# Patient Record
Sex: FEMALE | Race: WHITE | Employment: STUDENT | URBAN - METROPOLITAN AREA
[De-identification: names, ages, dates, MRNs, and addresses within clinical notes are randomized per-mention and may not be internally consistent; named-entity substitution may affect disease eponyms.]

---

## 2019-08-07 ENCOUNTER — OFFICE VISIT (OUTPATIENT)
Dept: FAMILY MEDICINE | Facility: CLINIC | Age: 24
End: 2019-08-07
Payer: COMMERCIAL

## 2019-08-07 VITALS
BODY MASS INDEX: 23.78 KG/M2 | RESPIRATION RATE: 14 BRPM | SYSTOLIC BLOOD PRESSURE: 130 MMHG | HEIGHT: 69 IN | OXYGEN SATURATION: 97 % | TEMPERATURE: 98.4 F | WEIGHT: 160.56 LBS | DIASTOLIC BLOOD PRESSURE: 77 MMHG | HEART RATE: 76 BPM

## 2019-08-07 DIAGNOSIS — Z00.00 ROUTINE GENERAL MEDICAL EXAMINATION AT A HEALTH CARE FACILITY: Primary | ICD-10-CM

## 2019-08-07 DIAGNOSIS — H10.33 ACUTE CONJUNCTIVITIS OF BOTH EYES, UNSPECIFIED ACUTE CONJUNCTIVITIS TYPE: ICD-10-CM

## 2019-08-07 PROCEDURE — G0145 SCR C/V CYTO,THINLAYER,RESCR: HCPCS | Performed by: FAMILY MEDICINE

## 2019-08-07 PROCEDURE — 99385 PREV VISIT NEW AGE 18-39: CPT | Performed by: FAMILY MEDICINE

## 2019-08-07 RX ORDER — ESCITALOPRAM OXALATE 10 MG/1
10 TABLET ORAL DAILY
COMMUNITY
End: 2021-05-14

## 2019-08-07 RX ORDER — POLYMYXIN B SULFATE AND TRIMETHOPRIM 1; 10000 MG/ML; [USP'U]/ML
1-2 SOLUTION OPHTHALMIC EVERY 4 HOURS
Qty: 1 BOTTLE | Refills: 0 | Status: SHIPPED | OUTPATIENT
Start: 2019-08-07 | End: 2020-02-21

## 2019-08-07 ASSESSMENT — PAIN SCALES - GENERAL: PAINLEVEL: NO PAIN (0)

## 2019-08-07 ASSESSMENT — MIFFLIN-ST. JEOR: SCORE: 1556.65

## 2019-08-07 NOTE — PROGRESS NOTES
SUBJECTIVE:   CC: Alfredito Moffett is an 23 year old woman who presents for preventive health visit.     Healthy Habits:     Getting at least 3 servings of Calcium per day:  Yes    Bi-annual eye exam:  NO    Dental care twice a year:  Yes    Sleep apnea or symptoms of sleep apnea:  None    Diet:  Gluten-free/reduced and Other    Frequency of exercise:  4-5 days/week    Duration of exercise:  30-45 minutes    Taking medications regularly:  Yes    Medication side effects:  None    PHQ-2 Total Score: 0    Additional concerns today:  No        -------------------------------------    Today's PHQ-2 Score:   PHQ-2 ( 1999 Pfizer) 8/7/2019   Q1: Little interest or pleasure in doing things 0   Q2: Feeling down, depressed or hopeless 0   PHQ-2 Score 0   Q1: Little interest or pleasure in doing things Not at all   Q2: Feeling down, depressed or hopeless Not at all   PHQ-2 Score 0       Abuse: Current or Past(Physical, Sexual or Emotional)- No  Do you feel safe in your environment? Yes    Social History     Tobacco Use     Smoking status: Never Smoker     Smokeless tobacco: Never Used   Substance Use Topics     Alcohol use: No     If you drink alcohol do you typically have >3 drinks per day or >7 drinks per week? No    Alcohol Use 8/7/2019   Prescreen: >3 drinks/day or >7 drinks/week? No   Prescreen: >3 drinks/day or >7 drinks/week? -   No flowsheet data found.    Reviewed orders with patient.  Reviewed health maintenance and updated orders accordingly - Yes  Patient Active Problem List   Diagnosis   (none) - all problems resolved or deleted     Past Surgical History:   Procedure Laterality Date     DENTAL SURGERY       EXCISE MASS LOWER EXTREMITY  8/1/2011    Procedure:EXCISE MASS LOWER EXTREMITY; Surgeon:CLARISSA DENT; Location: OR       Social History     Tobacco Use     Smoking status: Never Smoker     Smokeless tobacco: Never Used   Substance Use Topics     Alcohol use: No     Family History   Problem Relation  "Age of Onset     Anxiety Disorder Maternal Grandmother            Mammogram not appropriate for this patient based on age.    Pertinent mammograms are reviewed under the imaging tab.  History of abnormal Pap smear: NO - age 21-29 PAP every 3 years recommended     Reviewed and updated as needed this visit by clinical staff  Tobacco  Allergies  Meds  Problems  Med Hx  Surg Hx  Fam Hx         Reviewed and updated as needed this visit by Provider  Tobacco  Allergies  Meds  Problems  Med Hx  Surg Hx  Fam Hx            Review of Systems  CONSTITUTIONAL: NEGATIVE for fever, chills, change in weight  INTEGUMENTARU/SKIN: NEGATIVE for worrisome rashes, moles or lesions  EYES: red eyes-   ENT: NEGATIVE for ear, mouth and throat problems  RESP: NEGATIVE for significant cough or SOB  BREAST: NEGATIVE for masses, tenderness or discharge  CV: NEGATIVE for chest pain, palpitations or peripheral edema  GI: NEGATIVE for nausea, abdominal pain, heartburn, or change in bowel habits  : NEGATIVE for unusual urinary or vaginal symptoms. Periods are regular.  MUSCULOSKELETAL: NEGATIVE for significant arthralgias or myalgia  NEURO: NEGATIVE for weakness, dizziness or paresthesias  PSYCHIATRIC: NEGATIVE for changes in mood or affect     OBJECTIVE:   /77 (BP Location: Left arm, Patient Position: Sitting, Cuff Size: Adult Regular)   Pulse 76   Temp 98.4  F (36.9  C) (Oral)   Resp 14   Ht 1.759 m (5' 9.25\")   Wt 72.8 kg (160 lb 9 oz)   LMP 07/24/2019 (Approximate)   SpO2 97%   Breastfeeding? No   BMI 23.54 kg/m    Physical Exam  GENERAL: healthy, alert and no distress  EYES: PERRL, EOMI and conjunctiva/corneas- conjunctival injection OU  HENT: ear canals and TM's normal, nose and mouth without ulcers or lesions  NECK: no adenopathy, no asymmetry, masses, or scars and thyroid normal to palpation  RESP: lungs clear to auscultation - no rales, rhonchi or wheezes  BREAST: normal without masses, tenderness or nipple " "discharge and no palpable axillary masses or adenopathy  CV: regular rate and rhythm, normal S1 S2, no S3 or S4, no murmur, click or rub, no peripheral edema and peripheral pulses strong  ABDOMEN: soft, nontender, no hepatosplenomegaly, no masses and bowel sounds normal  MS: no gross musculoskeletal defects noted, no edema  SKIN: no suspicious lesions or rashes  NEURO: Normal strength and tone, mentation intact and speech normal  PSYCH: mentation appears normal, affect normal/bright    Diagnostic Test Results:  Labs reviewed in Epic  none     ASSESSMENT/PLAN:   1. Routine general medical examination at a health care facility  Routine screening  - Pap imaged thin layer screen only - recommended age 21 - 24 years    2. 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   - trimethoprim-polymyxin b (POLYTRIM) 91843-3.1 UNIT/ML-% ophthalmic solution; Place 1-2 drops into both eyes every 4 hours  Dispense: 1 Bottle; Refill: 0    COUNSELING:  Reviewed preventive health counseling, as reflected in patient instructions    Estimated body mass index is 23.54 kg/m  as calculated from the following:    Height as of this encounter: 1.759 m (5' 9.25\").    Weight as of this encounter: 72.8 kg (160 lb 9 oz).         reports that she has never smoked. She has never used smokeless tobacco.      Counseling Resources:  ATP IV Guidelines  Pooled Cohorts Equation Calculator  Breast Cancer Risk Calculator  FRAX Risk Assessment  ICSI Preventive Guidelines  Dietary Guidelines for Americans, 2010  USDA's MyPlate  ASA Prophylaxis  Lung CA Screening    Roseanne Manriquez, DO  [facility]  "

## 2019-08-07 NOTE — LETTER
August 13, 2019      Alfredito Zuhair  [address] [address]    Dear ,      I am happy to inform you that your recent cervical cancer screening test (PAP smear) was normal.      Preventative screenings such as this help to ensure your health for years to come. You should repeat a pap smear in 3 years, unless otherwise directed.      You will still need to return to the clinic every year for your annual exam and other preventive tests.     If you have additional questions regarding this result, please call our registered nurse, Linda at [phone].      Sincerely,      Roseanne Manriquez DO/[facility]

## 2019-08-07 NOTE — NURSING NOTE
"Chief Complaint   Patient presents with     Physical     /77 (BP Location: Left arm, Patient Position: Sitting, Cuff Size: Adult Regular)   Pulse 76   Temp 98.4  F (36.9  C) (Oral)   Resp 14   Ht 1.759 m (5' 9.25\")   Wt 72.8 kg (160 lb 9 oz)   LMP 07/24/2019 (Approximate)   SpO2 97%   Breastfeeding? No   BMI 23.54 kg/m   Estimated body mass index is 23.54 kg/m  as calculated from the following:    Height as of this encounter: 1.759 m (5' 9.25\").    Weight as of this encounter: 72.8 kg (160 lb 9 oz).  bp completed using cuff size: regular      Health Maintenance addressed:  NONE    N/a  Joanne Solitario CMA on 8/7/2019 at 3:44 PM                "

## 2019-08-12 LAB
COPATH REPORT: NORMAL
PAP: NORMAL

## 2020-02-21 ENCOUNTER — OFFICE VISIT (OUTPATIENT)
Dept: FAMILY MEDICINE | Facility: CLINIC | Age: 25
End: 2020-02-21
Payer: COMMERCIAL

## 2020-02-21 ENCOUNTER — TELEPHONE (OUTPATIENT)
Dept: FAMILY MEDICINE | Facility: CLINIC | Age: 25
End: 2020-02-21

## 2020-02-21 VITALS
WEIGHT: 161.9 LBS | SYSTOLIC BLOOD PRESSURE: 123 MMHG | HEIGHT: 69 IN | DIASTOLIC BLOOD PRESSURE: 66 MMHG | BODY MASS INDEX: 23.98 KG/M2 | TEMPERATURE: 97.9 F | OXYGEN SATURATION: 97 % | HEART RATE: 94 BPM

## 2020-02-21 DIAGNOSIS — Z00.00 ROUTINE GENERAL MEDICAL EXAMINATION AT A HEALTH CARE FACILITY: Primary | ICD-10-CM

## 2020-02-21 PROCEDURE — 86580 TB INTRADERMAL TEST: CPT | Performed by: FAMILY MEDICINE

## 2020-02-21 PROCEDURE — 90734 MENACWYD/MENACWYCRM VACC IM: CPT | Performed by: FAMILY MEDICINE

## 2020-02-21 PROCEDURE — 87389 HIV-1 AG W/HIV-1&-2 AB AG IA: CPT | Performed by: FAMILY MEDICINE

## 2020-02-21 PROCEDURE — 99395 PREV VISIT EST AGE 18-39: CPT | Mod: 25 | Performed by: FAMILY MEDICINE

## 2020-02-21 PROCEDURE — 36415 COLL VENOUS BLD VENIPUNCTURE: CPT | Performed by: FAMILY MEDICINE

## 2020-02-21 PROCEDURE — 86787 VARICELLA-ZOSTER ANTIBODY: CPT | Performed by: FAMILY MEDICINE

## 2020-02-21 PROCEDURE — 90471 IMMUNIZATION ADMIN: CPT | Performed by: FAMILY MEDICINE

## 2020-02-21 ASSESSMENT — MIFFLIN-ST. JEOR: SCORE: 1560.1

## 2020-02-21 NOTE — TELEPHONE ENCOUNTER
Reason for Call: Request for an order or referral:    Order or referral being requested: Pt requesting Quantiferon Gold Test. Pt needs order     Date needed: as soon as possible    Has the patient been seen by the PCP for this problem? YES    Additional comments: Pt called requesting above test. Please call to confirm order was placed     Phone number Patient can be reached at:  Home number on file [phone] (home)    Best Time:  Anytime     Can we leave a detailed message on this number?  YES    Call taken on 2/21/2020 at 2:51 PM by Jyothi Garcia

## 2020-02-21 NOTE — TELEPHONE ENCOUNTER
Discussed with pt and she will call a  [facility] to have the mantoux read this weekend.   Bryanna Steele RN

## 2020-02-21 NOTE — NURSING NOTE
"Chief Complaint   Patient presents with     Physical     /66   Pulse 94   Temp 97.9  F (36.6  C)   Ht 1.763 m (5' 9.4\")   Wt 73.4 kg (161 lb 14.4 oz)   LMP 01/31/2020 (Approximate)   SpO2 97%   BMI 23.63 kg/m   Estimated body mass index is 23.63 kg/m  as calculated from the following:    Height as of this encounter: 1.763 m (5' 9.4\").    Weight as of this encounter: 73.4 kg (161 lb 14.4 oz).  bp completed using cuff size: regular      Health Maintenance addressed:  NONE    n/a    Soumya Simpson MA    "

## 2020-02-21 NOTE — PROGRESS NOTES
SUBJECTIVE:   CC: Alfredito Moffett is an 24 year old woman who presents for preventive health visit.     Healthy Habits:     Getting at least 3 servings of Calcium per day:  Yes    Bi-annual eye exam:  NO    Dental care twice a year:  Yes    Sleep apnea or symptoms of sleep apnea:  None    Diet:  Gluten-free/reduced and Other    Frequency of exercise:  4-5 days/week    Duration of exercise:  30-45 minutes    Taking medications regularly:  Yes    Medication side effects:  None    PHQ-2 Total Score: 0    Additional concerns today:  No        Today's PHQ-2 Score:   PHQ-2 ( 1999 Pfizer) 2/21/2020   Q1: Little interest or pleasure in doing things 0   Q2: Feeling down, depressed or hopeless 0   PHQ-2 Score 0   Q1: Little interest or pleasure in doing things Not at all   Q2: Feeling down, depressed or hopeless Not at all   PHQ-2 Score 0       Abuse: Current or Past(Physical, Sexual or Emotional)- No  Do you feel safe in your environment? Yes        Social History     Tobacco Use     Smoking status: Never Smoker     Smokeless tobacco: Never Used   Substance Use Topics     Alcohol use: No     If you drink alcohol do you typically have >3 drinks per day or >7 drinks per week? No    Alcohol Use 2/21/2020   Prescreen: >3 drinks/day or >7 drinks/week? No   Prescreen: >3 drinks/day or >7 drinks/week? -   No flowsheet data found.    Reviewed orders with patient.  Reviewed health maintenance and updated orders accordingly - Yes  Patient Active Problem List   Diagnosis   (none) - all problems resolved or deleted     Past Surgical History:   Procedure Laterality Date     DENTAL SURGERY       EXCISE MASS LOWER EXTREMITY  8/1/2011    Procedure:EXCISE MASS LOWER EXTREMITY; Surgeon:CLARISSA DENT; Location: OR       Social History     Tobacco Use     Smoking status: Never Smoker     Smokeless tobacco: Never Used   Substance Use Topics     Alcohol use: No     Family History   Problem Relation Age of Onset     Anxiety Disorder  "Maternal Grandmother            Mammogram not appropriate for this patient based on age.    Pertinent mammograms are reviewed under the imaging tab.  History of abnormal Pap smear: NO - age 21-29 PAP every 3 years recommended  PAP / HPV 8/7/2019   PAP NIL     Reviewed and updated as needed this visit by clinical staff  Tobacco  Allergies  Meds  Problems  Med Hx  Surg Hx  Fam Hx  Soc Hx          Reviewed and updated as needed this visit by Provider  Tobacco  Allergies  Meds  Problems  Med Hx  Surg Hx  Fam Hx  Soc Hx             Review of Systems  CONSTITUTIONAL: NEGATIVE for fever, chills, change in weight  INTEGUMENTARU/SKIN: NEGATIVE for worrisome rashes, moles or lesions  EYES: NEGATIVE for vision changes or irritation  ENT: NEGATIVE for ear, mouth and throat problems  RESP: NEGATIVE for significant cough or SOB  BREAST: NEGATIVE for masses, tenderness or discharge  CV: NEGATIVE for chest pain, palpitations or peripheral edema  GI: NEGATIVE for nausea, abdominal pain, heartburn, or change in bowel habits  : NEGATIVE for unusual urinary or vaginal symptoms. Periods are regular.  MUSCULOSKELETAL: NEGATIVE for significant arthralgias or myalgia  NEURO: NEGATIVE for weakness, dizziness or paresthesias  PSYCHIATRIC: NEGATIVE for changes in mood or affect     OBJECTIVE:   /66   Pulse 94   Temp 97.9  F (36.6  C)   Ht 1.763 m (5' 9.4\")   Wt 73.4 kg (161 lb 14.4 oz)   LMP 01/31/2020 (Approximate)   SpO2 97%   BMI 23.63 kg/m    Physical Exam  GENERAL: healthy, alert and no distress  EYES: Eyes grossly normal to inspection, PERRL and conjunctivae and sclerae normal  HENT: ear canals and TM's normal, nose and mouth without ulcers or lesions  NECK: no adenopathy, no asymmetry, masses, or scars and thyroid normal to palpation  RESP: lungs clear to auscultation - no rales, rhonchi or wheezes  BREAST: normal without masses, tenderness or nipple discharge and no palpable axillary masses or " "adenopathy  CV: regular rate and rhythm, normal S1 S2, no S3 or S4, no murmur, click or rub, no peripheral edema and peripheral pulses strong  ABDOMEN: soft, nontender, no hepatosplenomegaly, no masses and bowel sounds normal  MS: no gross musculoskeletal defects noted, no edema  SKIN: no suspicious lesions or rashes  NEURO: Normal strength and tone, mentation intact and speech normal  PSYCH: mentation appears normal, affect normal/bright    Diagnostic Test Results:  Labs reviewed in Epic    ASSESSMENT/PLAN:   1. Routine general medical examination at a health care facility     - Varicella Zoster Virus Antibody IgG  - HIV Antigen Antibody Combo    COUNSELING:  Reviewed preventive health counseling, as reflected in patient instructions    Estimated body mass index is 23.63 kg/m  as calculated from the following:    Height as of this encounter: 1.763 m (5' 9.4\").    Weight as of this encounter: 73.4 kg (161 lb 14.4 oz).         reports that she has never smoked. She has never used smokeless tobacco.      Counseling Resources:  ATP IV Guidelines  Pooled Cohorts Equation Calculator  Breast Cancer Risk Calculator  FRAX Risk Assessment  ICSI Preventive Guidelines  Dietary Guidelines for Americans, 2010  USDA's MyPlate  ASA Prophylaxis  Lung CA Screening    Roseanne Manriquez, DO  [facility]  "

## 2020-02-22 LAB — VZV IGG SER QL IA: 7.1 AI (ref 0–0.8)

## 2020-02-24 ENCOUNTER — ALLIED HEALTH/NURSE VISIT (OUTPATIENT)
Dept: NURSING | Facility: CLINIC | Age: 25
End: 2020-02-24
Payer: COMMERCIAL

## 2020-02-24 DIAGNOSIS — Z11.1 SCREENING EXAMINATION FOR PULMONARY TUBERCULOSIS: Primary | ICD-10-CM

## 2020-02-24 LAB
HIV 1+2 AB+HIV1 P24 AG SERPL QL IA: NONREACTIVE
PPDINDURATION: 0 MM (ref 0–5)
PPDREDNESS: 13 MM

## 2020-02-24 PROCEDURE — 99207 ZZC NO CHARGE NURSE ONLY: CPT

## 2020-02-24 NOTE — PROGRESS NOTES
Mantoux results:  No induration.  No swelling.  Redness noted: 13mm.    Michelle LARA BSN, RN, PHN

## 2020-02-25 NOTE — RESULT ENCOUNTER NOTE
Please send copy of results with a letter:    Enclosed is a copy of your results. If you have any questions, please contact us at [phone].    Your varicella IgG shows you are immune from prior chicken pox infection.  HIV test is negative.      Thanks,   Roseanne Manriquez, DO

## 2020-03-03 ENCOUNTER — ALLIED HEALTH/NURSE VISIT (OUTPATIENT)
Dept: NURSING | Facility: CLINIC | Age: 25
End: 2020-03-03
Payer: COMMERCIAL

## 2020-03-03 DIAGNOSIS — Z11.1 SCREENING EXAMINATION FOR PULMONARY TUBERCULOSIS: Primary | ICD-10-CM

## 2020-03-03 PROCEDURE — 86580 TB INTRADERMAL TEST: CPT

## 2020-03-03 PROCEDURE — 99207 ZZC NO CHARGE NURSE ONLY: CPT

## 2020-03-06 ENCOUNTER — ALLIED HEALTH/NURSE VISIT (OUTPATIENT)
Dept: NURSING | Facility: CLINIC | Age: 25
End: 2020-03-06
Payer: COMMERCIAL

## 2020-03-06 DIAGNOSIS — Z11.1 SCREENING EXAMINATION FOR PULMONARY TUBERCULOSIS: Primary | ICD-10-CM

## 2020-03-06 LAB
PPDINDURATION: 0 MM (ref 0–5)
PPDREDNESS: 1 MM

## 2020-03-06 PROCEDURE — 99207 ZZC NO CHARGE NURSE ONLY: CPT

## 2020-08-19 NOTE — TELEPHONE ENCOUNTER
RECORDS RECEIVED FROM: Per Pt., Pt. had a Benign Tumor removed from Left Knee by Dr. Live in 2011.    Pt. has a New Benign Tumor in Left Knee  Per Pt., vascular malformation of knee, moveable soft mass    MRI Tria Borden   DATE RECEIVED: Aug 28, 2020   NOTES STATUS DETAILS   OFFICE NOTE from referring provider Internal    OFFICE NOTE from other specialist Care Everywhere    DISCHARGE SUMMARY from hospital N/A    DISCHARGE REPORT from the ER N/A    OPERATIVE REPORT Internal 8.1.11   MEDICATION LIST Internal    IMPLANT RECORD/STICKER N/A    LABS     CBC/DIFF N/A    CULTURES N/A    INJECTIONS DONE IN RADIOLOGY N/A    MRI PACS 6.21.11 Left knee, [facility]-PACS  8.14.20 Left knee, [facility]   CT SCAN N/A    XRAYS (IMAGES & REPORTS) PACS 8.14.20 Left knee, [facility]   TUMOR     PATHOLOGY  Slides & report N/A    08/19/20   2:15 PM   [facility]  Ciera Fields CMA    8.26.20 ALEKSANDRA   Called [facility] and spoke with April. She will push images.    8.26.20 ALEKSANDRA  Pulled images from [facility] into PACS.

## 2020-08-28 ENCOUNTER — OFFICE VISIT (OUTPATIENT)
Dept: ORTHOPEDICS | Facility: CLINIC | Age: 25
End: 2020-08-28
Payer: COMMERCIAL

## 2020-08-28 ENCOUNTER — PRE VISIT (OUTPATIENT)
Dept: ORTHOPEDICS | Facility: CLINIC | Age: 25
End: 2020-08-28

## 2020-08-28 ENCOUNTER — TELEPHONE (OUTPATIENT)
Dept: ORTHOPEDICS | Facility: CLINIC | Age: 25
End: 2020-08-28

## 2020-08-28 VITALS — HEIGHT: 70 IN | WEIGHT: 155 LBS | BODY MASS INDEX: 22.19 KG/M2

## 2020-08-28 DIAGNOSIS — Z11.59 ENCOUNTER FOR SCREENING FOR OTHER VIRAL DISEASES: Primary | ICD-10-CM

## 2020-08-28 DIAGNOSIS — D18.01 HEMANGIOMA OF SUBCUTANEOUS TISSUE: Primary | ICD-10-CM

## 2020-08-28 ASSESSMENT — MIFFLIN-ST. JEOR: SCORE: 1539.58

## 2020-08-28 NOTE — LETTER
8/28/2020         RE: Alfredito Moffett  [address] [place] [address]        Dear Colleague,    Thank you for referring your patient, Alfredito Moffett, to the [facility]. Please see a copy of my visit note below.    This 24-year-old woman had excision of her left knee hemangioma 9 years ago.  She now has pain just posterior to her previous surgical incision and numbness down in the dorsum of the foot in the midfoot and on the medial aspect of the foot.  She notices the pain most while running.  It does not radiate.  She is currently student in a gradual program.    On examination she is alert oriented has a normal mood and affect and is in no acute distress.  Respirations are regular and unlabored eyes are nonicteric.  In her left lower extremity there is no edema or erythema.  Grossly neurovascularly intact.  She does have some slight discoloration of the skin just anterior and posterior to her previous incision with some fullness there consistent with a venous malformation.    I reviewed her MRI which shows increase in size of her anterior compartment hemangioma as well as recurrence of some of her lateral knee hemangioma and growth of other lateral knee hemangiomas that may not been resected.  This growth has been slow over the last 9 years.  There does not seem to be any bone involvement.  The peroneal nerve was observed with no involvement there either.    The plan is to resect this patient symptomatic lesions in her previous surgical field.  Is not clear to me why she has symptoms radiating down to her foot.  What we could consider doing sclerotherapy of her anterior compartment lesion and see if that helps her out.  It is possible she has lesions outside of the MRI field-of-view that could be affecting her foot.  I have answered all of her questions.    Chief Complaint: Left knee pain, recurrent mass    History: Alfredito is a 24 yr old female who is here for evaluation of possible  "recurrent left knee mass.  She was seen 10-11 years ago by Dr. Dent who did an excision of a left knee hemangioma.  Since then she has rarely had issues with her left knee.  A month ago, however, she started experiencing pain and swelling in the area of the left lateral knee, especially with her daily running activities.  The pain will go away with rest.  It does not wake her up at night, but it can feel stiff in the morning.  She also is experiencing numbness down the side of her left leg to the top and side of her foot.  No tingling, no weakness.  She reports she is otherwise healthy.  She had no difficulty with her previous surgery.  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    No other concerns.    Past Medical History:   Diagnosis Date     Other symptoms referable to lower leg joint     left     Past Surgical History:   Procedure Laterality Date     DENTAL SURGERY       EXCISE MASS LOWER EXTREMITY  8/1/2011    Procedure:EXCISE MASS LOWER EXTREMITY; Surgeon:CLARISSA DENT; Location: OR     Family History   Problem Relation Age of Onset     Anxiety Disorder Maternal Grandmother      Social History     Tobacco Use     Smoking status: Never Smoker     Smokeless tobacco: Never Used   Substance Use Topics     Alcohol use: No       Meds:   Current Outpatient Medications   Medication     escitalopram (LEXAPRO) 10 MG tablet     No current facility-administered medications for this visit.        Allergies:    Allergies   Allergen Reactions     Dairy Products  [Milk Protein Extract]      Other reaction(s): Gastritis     Lactase-Lactobacillus        Review of Systems:  ROS: 10 point ROS neg other than the symptoms noted above in the HPI.    Physical Exam: Ht 1.78 m (5' 10.08\")   Wt 70.3 kg (155 lb)   BMI 22.19 kg/m     Alfredito is a healthy appearing 24 yr old female who is alert and oriented and in NAD.  She has a non-antalgic reciprocal gait.  Her left lateral knee has an old scar " from the previous surgery. There is no effusion or erythema.   Some vascular changes noted on the skin.  There is a small area of swelling where the mass is located.  No palpable mass around the fibular head or along the peroneal nerve course.  Patient does feel tenderness to palpation at the lateral joint line and just distal to that.  She has decreased sensation to light touch along the lateral left lower leg and dorsum of foot,but not including the toes. Strength is 5/5 resisted knee ext, knee flexion, hip flexion, DF/PF and toe extension bilaterally.    Imaging: MRI of the left knee without contrast from 8/14/20 shows:  1. Lobulated mass lesions of increased T2 signal as described at the lateral aspect of the knee with some prominent vessels in the subcutaneous fat anterolateral to the knee as well. Given history, constellation of findings likely reflect vascular malformations/soft tissue hemangiomas.  Other etiologies such as ganglion cyst thought less likely given appearance.  2. No ligamentous or meniscal tear. No focal cartilage defect.    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  There doesn't seem to be mass affecting the peroneal nerve, but perhaps could be some inflammation or spasm from the muscles surrounding the nerve.  We think removing the portion along the lateral joint line and patella would help her pain.  Exploring the small focus of tumor more distally may cause more problems, so if she is still having sx's after this initial excision, she may benefit from sclerotherapy on the more distal lesion to see if this helps resolve her peroneal nerve sx's.  She is in agreement with this plan.  This will be an outpatient procedure.  She may wait til she is on a break from school.  She will  likely be out from running about 4 wks.  There is a risk that the tumor can recur.  She understands and all questions have been answered.  Patient was also examined by Dr. Live and he agrees with the plan of care.

## 2020-08-28 NOTE — NURSING NOTE
Teaching Flowsheet   Relevant Diagnosis: excision left knee mass  Teaching Topic: preop     Person(s) involved in teaching:   Patient     Motivation Level:  Asks Questions: Yes  Eager to Learn: Yes  Cooperative: Yes  Receptive (willing/able to accept information): Yes  Any cultural factors/Christian beliefs that may influence understanding or compliance? No     Patient demonstrates understanding of the following:  Reason for the appointment, diagnosis and treatment plan: Yes  Knowledge of proper use of medications and conditions for which they are ordered (with special attention to potential side effects or drug interactions): Yes  Which situations necessitate calling provider and whom to contact: Yes    Teaching Concerns Addressed:        Proper use and care of soap (medical equip, care aids, etc.): Yes  Nutritional needs and diet plan: Yes  Pain management techniques: Yes  Wound Care: Yes  How and/when to access community resources: NA     Instructional Materials Used/Given: surgery packet reviewed with patient by RN.  She will schedule H&P with the [facility] clinic.  She is aware of covid and PAN calls coming in.  She has no other questions at this time.

## 2020-08-28 NOTE — PROGRESS NOTES
"Chief Complaint: Left knee pain, recurrent mass    History: Alfredito is a 24 yr old female who is here for evaluation of possible recurrent left knee mass.  She was seen 10-11 years ago by Dr. Dent who did an excision of a left knee hemangioma.  Since then she has rarely had issues with her left knee.  A month ago, however, she started experiencing pain and swelling in the area of the left lateral knee, especially with her daily running activities.  The pain will go away with rest.  It does not wake her up at night, but it can feel stiff in the morning.  She also is experiencing numbness down the side of her left leg to the top and side of her foot.  No tingling, no weakness.  She reports she is otherwise healthy.  She had no difficulty with her previous surgery.  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    No other concerns.    Past Medical History:   Diagnosis Date     Other symptoms referable to lower leg joint     left     Past Surgical History:   Procedure Laterality Date     DENTAL SURGERY       EXCISE MASS LOWER EXTREMITY  8/1/2011    Procedure:EXCISE MASS LOWER EXTREMITY; Surgeon:CLARISSA DENT; Location: OR     Family History   Problem Relation Age of Onset     Anxiety Disorder Maternal Grandmother      Social History     Tobacco Use     Smoking status: Never Smoker     Smokeless tobacco: Never Used   Substance Use Topics     Alcohol use: No       Meds:   Current Outpatient Medications   Medication     escitalopram (LEXAPRO) 10 MG tablet     No current facility-administered medications for this visit.        Allergies:    Allergies   Allergen Reactions     Dairy Products  [Milk Protein Extract]      Other reaction(s): Gastritis     Lactase-Lactobacillus        Review of Systems:  ROS: 10 point ROS neg other than the symptoms noted above in the HPI.    Physical Exam: Ht 1.78 m (5' 10.08\")   Wt 70.3 kg (155 lb)   BMI 22.19 kg/m     Alfredito is a healthy appearing 24 yr old " female who is alert and oriented and in NAD.  She has a non-antalgic reciprocal gait.  Her left lateral knee has an old scar from the previous surgery. There is no effusion or erythema.   Some vascular changes noted on the skin.  There is a small area of swelling where the mass is located.  No palpable mass around the fibular head or along the peroneal nerve course.  Patient does feel tenderness to palpation at the lateral joint line and just distal to that.  She has decreased sensation to light touch along the lateral left lower leg and dorsum of foot,but not including the toes. Strength is 5/5 resisted knee ext, knee flexion, hip flexion, DF/PF and toe extension bilaterally.    Imaging: MRI of the left knee without contrast from 8/14/20 shows:  1. Lobulated mass lesions of increased T2 signal as described at the lateral aspect of the knee with some prominent vessels in the subcutaneous fat anterolateral to the knee as well. Given history, constellation of findings likely reflect vascular malformations/soft tissue hemangiomas.  Other etiologies such as ganglion cyst thought less likely given appearance.  2. No ligamentous or meniscal tear. No focal cartilage defect.    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  There doesn't seem to be mass affecting the peroneal nerve, but perhaps could be some inflammation or spasm from the muscles surrounding the nerve.  We think removing the portion along the lateral joint line and patella would help her pain.  Exploring the small focus of tumor more distally may cause more problems, so if she is still having sx's after this initial excision, she may benefit from sclerotherapy on the more distal lesion to see if this helps resolve her peroneal nerve sx's.  She is  in agreement with this plan.  This will be an outpatient procedure.  She may wait til she is on a break from school.  She will likely be out from running about 4 wks.  There is a risk that the tumor can recur.  She understands and all questions have been answered.  Patient was also examined by Dr. Live and he agrees with the plan of care.

## 2020-08-28 NOTE — TELEPHONE ENCOUNTER
Patient is scheduled for surgery with Dr. Live      Spoke or left message with: spoke with KI Langston - scheduled with patient    Date of Surgery: 12/17/2020    Location: [facility]     Informed patient they will need an adult  yes     Pre-op with surgeon (if applicable): n/a    [facility]    POP: 2 week (video visit) scheduled on 1/6/2021 @ 8:30a    Additional imaging/appointments: n/a    Surgery packet: Given in clinic     Additional comments: Patient aware she will need a COVID test prior to surgery and that a  will be reaching out closer to [facility] to get that test scheduled.

## 2020-08-28 NOTE — PROGRESS NOTES
This 24-year-old woman had excision of her left knee hemangioma 9 years ago.  She now has pain just posterior to her previous surgical incision and numbness down in the dorsum of the foot in the midfoot and on the medial aspect of the foot.  She notices the pain most while running.  It does not radiate.  She is currently student in a gradual program.    On examination she is alert oriented has a normal mood and affect and is in no acute distress.  Respirations are regular and unlabored eyes are nonicteric.  In her left lower extremity there is no edema or erythema.  Grossly neurovascularly intact.  She does have some slight discoloration of the skin just anterior and posterior to her previous incision with some fullness there consistent with a venous malformation.    I reviewed her MRI which shows increase in size of her anterior compartment hemangioma as well as recurrence of some of her lateral knee hemangioma and growth of other lateral knee hemangiomas that may not been resected.  This growth has been slow over the last 9 years.  There does not seem to be any bone involvement.  The peroneal nerve was observed with no involvement there either.    The plan is to resect this patient symptomatic lesions in her previous surgical field.  Is not clear to me why she has symptoms radiating down to her foot.  What we could consider doing sclerotherapy of her anterior compartment lesion and see if that helps her out.  It is possible she has lesions outside of the MRI field-of-view that could be affecting her foot.  I have answered all of her questions.

## 2020-08-28 NOTE — NURSING NOTE
"Reason For Visit:   Chief Complaint   Patient presents with     Consult     new left knee mass per pt // noticed about a month ago and went to [facility] for MRI        Ht 1.78 m (5' 10.08\")   Wt 70.3 kg (155 lb)   BMI 22.19 kg/m      Pain Assessment  Patient Currently in Pain: Yes  0-10 Pain Scale: 6(6 with running // 0 with rest)  Primary Pain Location: Knee(left)  Pain Descriptors: Tingling, Numbness(tingling and numbness sometimes)        Dalila Benson ATC    "

## 2020-12-14 DIAGNOSIS — Z11.59 ENCOUNTER FOR SCREENING FOR OTHER VIRAL DISEASES: ICD-10-CM

## 2020-12-14 PROCEDURE — U0003 INFECTIOUS AGENT DETECTION BY NUCLEIC ACID (DNA OR RNA); SEVERE ACUTE RESPIRATORY SYNDROME CORONAVIRUS 2 (SARS-COV-2) (CORONAVIRUS DISEASE [COVID-19]), AMPLIFIED PROBE TECHNIQUE, MAKING USE OF HIGH THROUGHPUT TECHNOLOGIES AS DESCRIBED BY CMS-2020-01-R: HCPCS | Performed by: ORTHOPAEDIC SURGERY

## 2020-12-15 ENCOUNTER — OFFICE VISIT (OUTPATIENT)
Dept: FAMILY MEDICINE | Facility: CLINIC | Age: 25
End: 2020-12-15
Payer: COMMERCIAL

## 2020-12-15 VITALS
SYSTOLIC BLOOD PRESSURE: 128 MMHG | DIASTOLIC BLOOD PRESSURE: 78 MMHG | WEIGHT: 145 LBS | BODY MASS INDEX: 20.76 KG/M2 | HEART RATE: 68 BPM | OXYGEN SATURATION: 98 % | TEMPERATURE: 97.4 F

## 2020-12-15 DIAGNOSIS — R22.42 MASS OF LEFT KNEE: Primary | ICD-10-CM

## 2020-12-15 DIAGNOSIS — Z01.818 PRE-OP EXAM: ICD-10-CM

## 2020-12-15 LAB
LABORATORY COMMENT REPORT: NORMAL
SARS-COV-2 RNA SPEC QL NAA+PROBE: NEGATIVE
SARS-COV-2 RNA SPEC QL NAA+PROBE: NORMAL
SPECIMEN SOURCE: NORMAL
SPECIMEN SOURCE: NORMAL

## 2020-12-15 PROCEDURE — 99214 OFFICE O/P EST MOD 30 MIN: CPT | Performed by: NURSE PRACTITIONER

## 2020-12-15 ASSESSMENT — PAIN SCALES - GENERAL: PAINLEVEL: NO PAIN (0)

## 2020-12-15 NOTE — PATIENT INSTRUCTIONS

## 2020-12-15 NOTE — PROGRESS NOTES
[facility] [address] [address]  Phone: [phone]  Fax: [phone]  Primary Provider: Roseanne Manriquez  Pre-op Performing Provider: ERIC MITCHELL    PREOPERATIVE EVALUATION:  Today's date: 12/15/2020    Alfredito Moffett is a 25 year old female who presents for a preoperative evaluation.    Surgical Information:  Surgery/Procedure: Excision left knee mass  Surgery Location: [facility] OR  Surgeon: Earl Live MD  Surgery Date: 12/17/2020  Time of Surgery: 11:10 AM  Where patient plans to recover: At home with family  Fax number for surgical facility: Note does not need to be faxed, will be available electronically in Epic.    Type of Anesthesia Anticipated: General    Subjective     HPI related to upcoming procedure: Takes Lexapro for Anxiety.    Preop Questions 12/15/2020   1. Have you ever had a heart attack or stroke? No   2. Have you ever had surgery on your heart or blood vessels, such as a stent placement, a coronary artery bypass, or surgery on an artery in your head, neck, heart, or legs? No   3. Do you have chest pain with activity? No   4. Do you have a history of  heart failure? No   5. Do you currently have a cold, bronchitis or symptoms of other infection? No   6. Do you have a cough, shortness of breath, or wheezing? No   7. Do you or anyone in your family have previous history of blood clots? No   8. Do you or does anyone in your family have a serious bleeding problem such as prolonged bleeding following surgeries or cuts? No   9. Have you ever had problems with anemia or been told to take iron pills? No   10. Have you had any abnormal blood loss such as black, tarry or bloody stools, or abnormal vaginal bleeding? No   11. Have you ever had a blood transfusion? No   12. Are you willing to have a blood transfusion if it is medically needed before, during, or after your surgery? Yes   13. Have you or any of your  relatives ever had problems with anesthesia? No   14. Do you have sleep apnea, excessive snoring or daytime drowsiness? No   15. Do you have any artifical heart valves or other implanted medical devices like a pacemaker, defibrillator, or continuous glucose monitor? No   16. Do you have artificial joints? No   17. Are you allergic to latex? No   18. Is there any chance that you may be pregnant? No       Health Care Directive:  Patient does not have a Health Care Directive or Living Will: Discussed advance care planning with patient; however, patient declined at this time.    Preoperative Review of :  No Meds prescribed.     Review of Systems  Constitutional, neuro, ENT, endocrine, pulmonary, cardiac, gastrointestinal, genitourinary, musculoskeletal, integument and psychiatric systems are negative, except as otherwise noted.    Patient Active Problem List    Diagnosis Date Noted     Hemangioma of subcutaneous tissue 08/28/2020     Priority: Medium     Added automatically from request for surgery [id number]        Past Medical History:   Diagnosis Date     Other symptoms referable to lower leg joint     left     Past Surgical History:   Procedure Laterality Date     DENTAL SURGERY       EXCISE MASS LOWER EXTREMITY  8/1/2011    Procedure:EXCISE MASS LOWER EXTREMITY; Surgeon:CLARISSA DENT; Location:[facility]     Current Outpatient Medications   Medication Sig Dispense Refill     escitalopram (LEXAPRO) 10 MG tablet Take 10 mg by mouth daily         Allergies   Allergen Reactions     Dairy Products  [Milk Protein Extract]      Other reaction(s): Gastritis     Lactase-Lactobacillus         Social History     Tobacco Use     Smoking status: Never Smoker     Smokeless tobacco: Never Used   Substance Use Topics     Alcohol use: Yes     Comment: A glass of wine or two per week     Family History   Problem Relation Age of Onset     Anxiety Disorder Maternal Grandmother      History   Drug Use No         Objective     BP  128/78   Pulse 68   Temp 97.4  F (36.3  C) (Oral)   Wt 65.8 kg (145 lb)   SpO2 98%   BMI 20.76 kg/m      Physical Exam     GENERAL APPEARANCE: healthy, alert and no distress     EYES: EOMI, PERRL     HENT: ear canals and TM's normal and nose and mouth without ulcers or lesions     NECK: no adenopathy, no asymmetry, masses, or scars and thyroid normal to palpation     RESP: lungs clear to auscultation - no rales, rhonchi or wheezes     CV: regular rates and rhythm, normal S1 S2, no S3 or S4 and no murmur, click or rub     ABDOMEN:  soft, nontender, no HSM or masses and bowel sounds normal     MS: extremities normal- no gross deformities noted, no evidence of inflammation in joints, FROM in all extremities.     SKIN: no suspicious lesions or rashes     NEURO: Normal strength and tone, sensory exam grossly normal, mentation intact and speech normal     PSYCH: mentation appears normal. and affect normal/bright     LYMPHATICS: No cervical adenopathy    No results for input(s): HGB, PLT, INR, NA, POTASSIUM, CR, A1C in the last 61340 hours.     Diagnostics:  No labs were ordered during this visit.   No EKG required, no history of coronary heart disease, significant arrhythmia, peripheral arterial disease or other structural heart disease.    Revised Cardiac Risk Index (RCRI):  The patient has the following serious cardiovascular risks for perioperative complications:   - No serious cardiac risks = 0 points     RCRI Interpretation: 0 points: Class I (very low risk - 0.4% complication rate)       Assessment & Plan   The proposed surgical procedure is considered LOW risk.    Mass of left knee      Pre-op exam    Risks and Recommendations:  The patient has the following additional risks and recommendations for perioperative complications:   - No identified additional risk factors other than previously addressed    Medication Instructions:  Patient is to take all scheduled medications on the day of  surgery    RECOMMENDATION:  APPROVAL GIVEN to proceed with proposed procedure, without further diagnostic evaluation.    Signed Electronically by: Randa Paulino NP    Copy of this evaluation report is provided to requesting physician.    Preop [facility] Preop Guidelines    Revised Cardiac Risk Index

## 2020-12-15 NOTE — NURSING NOTE
Chief Complaint   Patient presents with     Pre-Op Exam     Pt is here for a pre op examination.     PRINCESS Willis 3:35 PM  12/15/2020

## 2020-12-16 ENCOUNTER — ANESTHESIA EVENT (OUTPATIENT)
Dept: SURGERY | Facility: AMBULATORY SURGERY CENTER | Age: 25
End: 2020-12-16

## 2020-12-17 ENCOUNTER — ANESTHESIA (OUTPATIENT)
Dept: SURGERY | Facility: AMBULATORY SURGERY CENTER | Age: 25
End: 2020-12-17

## 2020-12-17 ENCOUNTER — HOSPITAL ENCOUNTER (OUTPATIENT)
Facility: AMBULATORY SURGERY CENTER | Age: 25
Discharge: HOME | End: 2020-12-17
Attending: ORTHOPAEDIC SURGERY | Admitting: ORTHOPAEDIC SURGERY
Payer: COMMERCIAL

## 2020-12-17 VITALS
TEMPERATURE: 98 F | RESPIRATION RATE: 16 BRPM | DIASTOLIC BLOOD PRESSURE: 68 MMHG | SYSTOLIC BLOOD PRESSURE: 119 MMHG | OXYGEN SATURATION: 98 % | BODY MASS INDEX: 20.76 KG/M2 | HEART RATE: 78 BPM | HEIGHT: 70 IN | WEIGHT: 145 LBS

## 2020-12-17 DIAGNOSIS — D18.01 HEMANGIOMA OF SUBCUTANEOUS TISSUE: ICD-10-CM

## 2020-12-17 LAB — B-HCG SERPL-ACNC: <1 IU/L (ref 0–5)

## 2020-12-17 PROCEDURE — 88307 TISSUE EXAM BY PATHOLOGIST: CPT | Performed by: PATHOLOGY

## 2020-12-17 PROCEDURE — 36415 COLL VENOUS BLD VENIPUNCTURE: CPT | Performed by: PATHOLOGY

## 2020-12-17 PROCEDURE — 84702 CHORIONIC GONADOTROPIN TEST: CPT | Performed by: PATHOLOGY

## 2020-12-17 PROCEDURE — 27331 EXPLORE/TREAT KNEE JOINT: CPT | Mod: LT

## 2020-12-17 RX ORDER — SODIUM CHLORIDE, SODIUM LACTATE, POTASSIUM CHLORIDE, CALCIUM CHLORIDE 600; 310; 30; 20 MG/100ML; MG/100ML; MG/100ML; MG/100ML
INJECTION, SOLUTION INTRAVENOUS CONTINUOUS
Status: DISCONTINUED | OUTPATIENT
Start: 2020-12-17 | End: 2020-12-17 | Stop reason: HOSPADM

## 2020-12-17 RX ORDER — NALOXONE HYDROCHLORIDE 0.4 MG/ML
0.2 INJECTION, SOLUTION INTRAMUSCULAR; INTRAVENOUS; SUBCUTANEOUS
Status: DISCONTINUED | OUTPATIENT
Start: 2020-12-17 | End: 2020-12-18 | Stop reason: HOSPADM

## 2020-12-17 RX ORDER — NALOXONE HYDROCHLORIDE 0.4 MG/ML
0.4 INJECTION, SOLUTION INTRAMUSCULAR; INTRAVENOUS; SUBCUTANEOUS
Status: DISCONTINUED | OUTPATIENT
Start: 2020-12-17 | End: 2020-12-18 | Stop reason: HOSPADM

## 2020-12-17 RX ORDER — ONDANSETRON 4 MG/1
4 TABLET, ORALLY DISINTEGRATING ORAL EVERY 30 MIN PRN
Status: DISCONTINUED | OUTPATIENT
Start: 2020-12-17 | End: 2020-12-18 | Stop reason: HOSPADM

## 2020-12-17 RX ORDER — IBUPROFEN 600 MG/1
600 TABLET, FILM COATED ORAL EVERY 6 HOURS PRN
Qty: 30 TABLET | Refills: 0 | Status: SHIPPED | OUTPATIENT
Start: 2020-12-17 | End: 2021-05-14

## 2020-12-17 RX ORDER — FENTANYL CITRATE 50 UG/ML
INJECTION, SOLUTION INTRAMUSCULAR; INTRAVENOUS PRN
Status: DISCONTINUED | OUTPATIENT
Start: 2020-12-17 | End: 2020-12-17

## 2020-12-17 RX ORDER — GABAPENTIN 300 MG/1
300 CAPSULE ORAL ONCE
Status: COMPLETED | OUTPATIENT
Start: 2020-12-17 | End: 2020-12-17

## 2020-12-17 RX ORDER — SODIUM CHLORIDE, SODIUM LACTATE, POTASSIUM CHLORIDE, CALCIUM CHLORIDE 600; 310; 30; 20 MG/100ML; MG/100ML; MG/100ML; MG/100ML
INJECTION, SOLUTION INTRAVENOUS CONTINUOUS
Status: DISCONTINUED | OUTPATIENT
Start: 2020-12-17 | End: 2020-12-18 | Stop reason: HOSPADM

## 2020-12-17 RX ORDER — ACETAMINOPHEN 325 MG/1
975 TABLET ORAL ONCE
Status: COMPLETED | OUTPATIENT
Start: 2020-12-17 | End: 2020-12-17

## 2020-12-17 RX ORDER — ONDANSETRON 2 MG/ML
4 INJECTION INTRAMUSCULAR; INTRAVENOUS EVERY 30 MIN PRN
Status: DISCONTINUED | OUTPATIENT
Start: 2020-12-17 | End: 2020-12-18 | Stop reason: HOSPADM

## 2020-12-17 RX ORDER — LIDOCAINE HYDROCHLORIDE 20 MG/ML
INJECTION, SOLUTION INFILTRATION; PERINEURAL PRN
Status: DISCONTINUED | OUTPATIENT
Start: 2020-12-17 | End: 2020-12-17

## 2020-12-17 RX ORDER — LIDOCAINE 40 MG/G
CREAM TOPICAL
Status: DISCONTINUED | OUTPATIENT
Start: 2020-12-17 | End: 2020-12-17 | Stop reason: HOSPADM

## 2020-12-17 RX ORDER — ACETAMINOPHEN 325 MG/1
650 TABLET ORAL EVERY 4 HOURS PRN
Qty: 50 TABLET | Refills: 0 | Status: SHIPPED | OUTPATIENT
Start: 2020-12-17 | End: 2021-05-14

## 2020-12-17 RX ORDER — ONDANSETRON 2 MG/ML
INJECTION INTRAMUSCULAR; INTRAVENOUS PRN
Status: DISCONTINUED | OUTPATIENT
Start: 2020-12-17 | End: 2020-12-17

## 2020-12-17 RX ORDER — MEPERIDINE HYDROCHLORIDE 25 MG/ML
12.5 INJECTION INTRAMUSCULAR; INTRAVENOUS; SUBCUTANEOUS
Status: DISCONTINUED | OUTPATIENT
Start: 2020-12-17 | End: 2020-12-18 | Stop reason: HOSPADM

## 2020-12-17 RX ORDER — CEFAZOLIN SODIUM 1 G/50ML
1 SOLUTION INTRAVENOUS SEE ADMIN INSTRUCTIONS
Status: DISCONTINUED | OUTPATIENT
Start: 2020-12-17 | End: 2020-12-17 | Stop reason: HOSPADM

## 2020-12-17 RX ORDER — DEXAMETHASONE SODIUM PHOSPHATE 4 MG/ML
INJECTION, SOLUTION INTRA-ARTICULAR; INTRALESIONAL; INTRAMUSCULAR; INTRAVENOUS; SOFT TISSUE PRN
Status: DISCONTINUED | OUTPATIENT
Start: 2020-12-17 | End: 2020-12-17

## 2020-12-17 RX ORDER — FENTANYL CITRATE 50 UG/ML
25-50 INJECTION, SOLUTION INTRAMUSCULAR; INTRAVENOUS
Status: DISCONTINUED | OUTPATIENT
Start: 2020-12-17 | End: 2020-12-17 | Stop reason: HOSPADM

## 2020-12-17 RX ORDER — HYDROMORPHONE HYDROCHLORIDE 1 MG/ML
.3-.5 INJECTION, SOLUTION INTRAMUSCULAR; INTRAVENOUS; SUBCUTANEOUS EVERY 10 MIN PRN
Status: DISCONTINUED | OUTPATIENT
Start: 2020-12-17 | End: 2020-12-18 | Stop reason: HOSPADM

## 2020-12-17 RX ORDER — KETOROLAC TROMETHAMINE 30 MG/ML
INJECTION, SOLUTION INTRAMUSCULAR; INTRAVENOUS PRN
Status: DISCONTINUED | OUTPATIENT
Start: 2020-12-17 | End: 2020-12-17

## 2020-12-17 RX ORDER — FLUMAZENIL 0.1 MG/ML
0.2 INJECTION, SOLUTION INTRAVENOUS
Status: DISCONTINUED | OUTPATIENT
Start: 2020-12-17 | End: 2020-12-17 | Stop reason: HOSPADM

## 2020-12-17 RX ORDER — OXYCODONE HYDROCHLORIDE 5 MG/1
5 TABLET ORAL EVERY 4 HOURS PRN
Status: DISCONTINUED | OUTPATIENT
Start: 2020-12-17 | End: 2020-12-18 | Stop reason: HOSPADM

## 2020-12-17 RX ORDER — CEFAZOLIN SODIUM 2 G/50ML
2 SOLUTION INTRAVENOUS
Status: COMPLETED | OUTPATIENT
Start: 2020-12-17 | End: 2020-12-17

## 2020-12-17 RX ORDER — BUPIVACAINE HYDROCHLORIDE 2.5 MG/ML
INJECTION, SOLUTION INFILTRATION; PERINEURAL PRN
Status: DISCONTINUED | OUTPATIENT
Start: 2020-12-17 | End: 2020-12-17 | Stop reason: HOSPADM

## 2020-12-17 RX ORDER — PROPOFOL 10 MG/ML
INJECTION, EMULSION INTRAVENOUS CONTINUOUS PRN
Status: DISCONTINUED | OUTPATIENT
Start: 2020-12-17 | End: 2020-12-17

## 2020-12-17 RX ORDER — PROPOFOL 10 MG/ML
INJECTION, EMULSION INTRAVENOUS PRN
Status: DISCONTINUED | OUTPATIENT
Start: 2020-12-17 | End: 2020-12-17

## 2020-12-17 RX ORDER — OXYCODONE HYDROCHLORIDE 5 MG/1
5-10 TABLET ORAL EVERY 4 HOURS PRN
Qty: 12 TABLET | Refills: 0 | Status: SHIPPED | OUTPATIENT
Start: 2020-12-17 | End: 2021-05-14

## 2020-12-17 RX ORDER — AMOXICILLIN 250 MG
1-2 CAPSULE ORAL 2 TIMES DAILY
Qty: 30 TABLET | Refills: 0 | Status: SHIPPED | OUTPATIENT
Start: 2020-12-17 | End: 2021-05-14

## 2020-12-17 RX ADMIN — GABAPENTIN 300 MG: 300 CAPSULE ORAL at 10:20

## 2020-12-17 RX ADMIN — SODIUM CHLORIDE, SODIUM LACTATE, POTASSIUM CHLORIDE, CALCIUM CHLORIDE: 600; 310; 30; 20 INJECTION, SOLUTION INTRAVENOUS at 10:51

## 2020-12-17 RX ADMIN — ONDANSETRON 4 MG: 2 INJECTION INTRAMUSCULAR; INTRAVENOUS at 11:40

## 2020-12-17 RX ADMIN — FENTANYL CITRATE 25 MCG: 50 INJECTION, SOLUTION INTRAMUSCULAR; INTRAVENOUS at 11:33

## 2020-12-17 RX ADMIN — ACETAMINOPHEN 975 MG: 325 TABLET ORAL at 10:20

## 2020-12-17 RX ADMIN — LIDOCAINE HYDROCHLORIDE 100 MG: 20 INJECTION, SOLUTION INFILTRATION; PERINEURAL at 11:33

## 2020-12-17 RX ADMIN — PROPOFOL 150 MG: 10 INJECTION, EMULSION INTRAVENOUS at 11:33

## 2020-12-17 RX ADMIN — CEFAZOLIN SODIUM 2 G: 2 SOLUTION INTRAVENOUS at 11:40

## 2020-12-17 RX ADMIN — KETOROLAC TROMETHAMINE 30 MG: 30 INJECTION, SOLUTION INTRAMUSCULAR; INTRAVENOUS at 11:40

## 2020-12-17 RX ADMIN — PROPOFOL 150 MCG/KG/MIN: 10 INJECTION, EMULSION INTRAVENOUS at 11:32

## 2020-12-17 RX ADMIN — DEXAMETHASONE SODIUM PHOSPHATE 4 MG: 4 INJECTION, SOLUTION INTRA-ARTICULAR; INTRALESIONAL; INTRAMUSCULAR; INTRAVENOUS; SOFT TISSUE at 11:40

## 2020-12-17 RX ADMIN — PROPOFOL: 10 INJECTION, EMULSION INTRAVENOUS at 12:22

## 2020-12-17 RX ADMIN — FENTANYL CITRATE 50 MCG: 50 INJECTION, SOLUTION INTRAMUSCULAR; INTRAVENOUS at 11:52

## 2020-12-17 ASSESSMENT — MIFFLIN-ST. JEOR: SCORE: 1480.03

## 2020-12-17 NOTE — ANESTHESIA PREPROCEDURE EVALUATION
"Anesthesia Pre-Procedure Evaluation    Patient: Alfredito Moffett   MRN:     [id number] Gender:   female   Age:    25 year old :      1995        Preoperative Diagnosis: Hemangioma of subcutaneous tissue [D18.01]   Procedure(s):  Excision left knee mass     LABS:  CBC: No results found for: WBC, HGB, HCT, PLT  BMP: No results found for: NA, POTASSIUM, CHLORIDE, CO2, BUN, CR, GLC  COAGS: No results found for: PTT, INR, FIBR  POC: No results found for: BGM, HCG, HCGS  OTHER: No results found for: PH, LACT, A1C, ANDRÉS, PHOS, MAG, ALBUMIN, PROTTOTAL, ALT, AST, GGT, ALKPHOS, BILITOTAL, BILIDIRECT, LIPASE, AMYLASE, JAZMÍN, TSH, T4, T3, CRP, SED     Preop Vitals    BP Readings from Last 3 Encounters:   20 137/83   12/15/20 128/78   20 123/66    Pulse Readings from Last 3 Encounters:   20 80   12/15/20 68   20 94      Resp Readings from Last 3 Encounters:   20 18   19 14   11 16    SpO2 Readings from Last 3 Encounters:   20 98%   12/15/20 98%   20 97%      Temp Readings from Last 1 Encounters:   20 36.1  C (97  F) (Temporal)    Ht Readings from Last 1 Encounters:   20 1.765 m (5' 9.5\")      Wt Readings from Last 1 Encounters:   20 65.8 kg (145 lb)    Estimated body mass index is 21.11 kg/m  as calculated from the following:    Height as of this encounter: 1.765 m (5' 9.5\").    Weight as of this encounter: 65.8 kg (145 lb).     LDA:        Past Medical History:   Diagnosis Date     Other symptoms referable to lower leg joint     left      Past Surgical History:   Procedure Laterality Date     DENTAL SURGERY       EXCISE MASS LOWER EXTREMITY  2011    Procedure:EXCISE MASS LOWER EXTREMITY; Surgeon:CLARISSA DENT; Location:[facility]      Allergies   Allergen Reactions     Dairy Products  [Milk Protein Extract]      Other reaction(s): Gastritis     Lactase-Lactobacillus         Anesthesia Evaluation     . Pt has had prior anesthetic.     No " history of anesthetic complications          ROS/MED HX    ENT/Pulmonary:  - neg pulmonary ROS     Neurologic:  - neg neurologic ROS     Cardiovascular:  - neg cardiovascular ROS       METS/Exercise Tolerance:     Hematologic:  - neg hematologic  ROS       Musculoskeletal:  - neg musculoskeletal ROS       GI/Hepatic:  - neg GI/hepatic ROS       Renal/Genitourinary:  - ROS Renal section negative       Endo:  - neg endo ROS       Psychiatric:  - neg psychiatric ROS       Infectious Disease:  - neg infectious disease ROS       Malignancy:      - no malignancy   Other:    - neg other ROS                 JZG FV AN PHYSICAL EXAM    Assessment:   ASA SCORE: 1    H&P: History and physical reviewed and following examination; no interval change.    NPO Status: NPO Appropriate     Plan:   Anes. Type:  General   Pre-Medication: None   Induction:  IV (Standard)   Airway: LMA   Access/Monitoring: PIV   Maintenance: Balanced     Postop Plan:   Postop Pain: Opioids  Postop Sedation/Airway: Not planned  Disposition: Outpatient     PONV Management:   Adult Risk Factors: Female, Postop Opioids   Prevention: Ondansetron, Dexamethasone, Propofol, No Volatiles     CONSENT: Direct conversation                         Susie Sims MD

## 2020-12-17 NOTE — DISCHARGE INSTRUCTIONS
[facility]  Home Care Following Anesthesia  For 24 hours after surgery:  1. Get plenty of rest.  A responsible adult must stay with you for at least 24 hours after you leave the surgery center.  2. Do not drive or use heavy equipment.  If you have weakness or tingling, don't drive or use heavy equipment until this feeling goes away.   3. Do not drink alcohol.   4. Avoid strenuous or risky activities.  Ask for help when climbing stairs.  5. You may feel lightheaded.  IF so, sit for a few minutes before standing.  Have someone help you get up.   6. If you have nausea (feel sick to your stomach): Drink only clear liquids such as apple juice, ginger ale, broth or 7-Up.  Rest may also help.  Be sure to drink enough fluids.  Move to a regular diet as you feel able.   7. You may have a slight fever.  Call the doctor if your fever is over 100 F (37.7 C) (taken under the tongue) or lasts longer than 24 hours.  8. You may have a dry mouth, a sore throat, muscle aches or trouble sleeping. These should go away after 24 hours.  9. Do not make important or legal decisions.               Tips for taking pain medications  To get the best pain relief possible, remember these points:    Take pain medications as directed, before pain becomes severe.    Pain medication can upset your stomach: taking it with food may help.    Constipation is a common side effect of pain medication. Drink plenty of  fluids.    Eat foods high in fiber. Take a stool softener if recommended by your doctor or pharmacist.    Do not drink alcohol, drive or operate machinery while taking pain medications.    Ask about other ways to control pain, such as with heat, ice or relaxation.    Tylenol/Acetaminophen Consumption  To help encourage the safe use of acetaminophen, the makers of TYLENOL  have lowered the maximum daily dose for single-ingredient Extra Strength TYLENOL  (acetaminophen) products sold in the [place] from 8  pills per day (4,000 mg) to 6 pills per day (3,000 mg). The dosing interval has also changed from 2 pills every 4-6 hours to 2 pills every 6 hours.    If you feel your pain relief is insufficient, you may take Tylenol/Acetaminophen in addition to your narcotic pain medication.     Be careful not to exceed 3,000 mg of Tylenol/Acetaminophen in a 24 hour period from all sources.    If you are taking extra strength Tylenol/acetaminophen (500 mg), the maximum dose is 6 tablets in 24 hours.    If you are taking regular strength acetaminophen (325 mg), the maximum dose is 9 tablets in 24 hours.    Call a doctor for any of the followin. Signs of infection (fever, growing tenderness at the surgery site, a large amount of drainage or bleeding, severe pain, foul-smelling drainage, redness, swelling).  2. It has been over 8 to 10 hours since surgery and you are still not able to urinate (pass water).  3. Headache for over 24 hours.  4. Numbness, tingling or weakness the day after surgery (if you had spinal anesthesia).  5. Signs of Covid-19 infection (temperature over 100 degrees, shortness of breath, cough, loss of taste/smell, generalized body aches, persistent headache, chills, sore throat, nausea/vomiting/diarrhea)  Your doctor is:       Dr. Earl Live, Orthopaedics: [phone]               Or dial [phone] and ask for the resident on call for:  Orthopaedics  For emergency care, call the:  [facility] Emergency Department: [phone] (TTY for hearing impaired: [phone])

## 2020-12-17 NOTE — BRIEF OP NOTE
[facility] Brief Operative Note    Pre-operative diagnosis: Hemangioma of subcutaneous tissue [D18.01], left knee   Post-operative diagnosis Abiodun   Procedure: Procedure(s):  Excision left knee mass, knee arthrotomy   Surgeon(s): Surgeon(s) and Role:     * Earl Live MD - Primary   Estimated blood loss: 25 cc    Specimens: ID Type Source Tests Collected by Time Destination   A : Left Knee Mass Tissue Leg, left SURGICAL PATHOLOGY EXAM Earl Live MD 12/17/2020 12:05 PM       Findings: See op report      POSTOP PLAN:  SDS  ADAT  WB status: WBAT LLE  Antibiotics: preop ancef  Dressing: Keep aquacel c/d/i x7 days, then remove. Leave steri strips in place. OK to shower over dressing.  Elevate/ice operative extremity  ACE wrap in place x5 days for compression  Pain: oxycodone PRN, tylenol and NSAID PRN  Follow up: 1/6/21 with Dr. Live  Dispo: Ok to discharge once meets criteria in PACU    Tushar Mims MD  Orthopaedic Surgery, PGY4  [phone]

## 2020-12-17 NOTE — ANESTHESIA POSTPROCEDURE EVALUATION
Anesthesia POST Procedure Evaluation    Patient: Alfredito Moffett   MRN:     [id number] Gender:   female   Age:    25 year old :      1995        Preoperative Diagnosis: Hemangioma of subcutaneous tissue [D18.01]   Procedure(s):  Excision left knee mass, knee arthrotomy   Postop Comments: No value filed.     Anesthesia Type: General       Disposition: Outpatient   Postop Pain Control: Uneventful            Sign Out: Well controlled pain   PONV: No   Neuro/Psych: Uneventful            Sign Out: Acceptable/Baseline neuro status   Airway/Respiratory: Uneventful            Sign Out: Acceptable/Baseline resp. status   CV/Hemodynamics: Uneventful            Sign Out: Acceptable CV status   Other NRE: NONE   DID A NON-ROUTINE EVENT OCCUR? No         Last Anesthesia Record Vitals:  CRNA VITALS  2020 1231 - 2020 1331      2020             Resp Rate (observed):  (!) 1    Resp Rate (set):  10          Last PACU Vitals:  Vitals Value Taken Time   /68 20 1315   Temp 36.7  C (98  F) 20 1315   Pulse 78 20 1315   Resp 16 20 1315   SpO2 98 % 20 1315   Temp src     NIBP     Pulse     SpO2     Resp     Temp     Ht Rate     Temp 2           Electronically Signed By: Susie Sims MD, 2020, 2:30 PM

## 2020-12-17 NOTE — OP NOTE
DATE OF SURGERY: 12/17/2020    PREOPERATIVE DIAGNOSIS: Left knee hemangioma    POSTOPERATIVE DIAGNOSIS: Left knee hemangioma    PROCEDURE: Excision left knee hemangioma including arthrotomy    SURGEON: Earl Live MD     ASSISTANT: Tushar Mims MD    PATIENT HISTORY: This patient has a long history of a hemangioma.  The head of surgery years ago but the hemangioma has persisted and become more symptomatic.  She understands the risks of stiffness of bleeding infection pain numbness tingling and persistence and/or recurrence of the hemangioma.    DESCRIPTION OF PROCEDURE: The patient was placed supine after general anesthesia and the left leg was washed and sterilely prepped and draped.  I made an incision excising some of the old scar and extending that incision proximally distally.  After incising the skin we noticed immediately abnormal veins in the deep skin and subcutaneous tissue.  I was able to excise some of these.  We carried the dissection down to the capsule.  Some of the hemangioma involve the edge of the patellar tendon.  This was excised.  The capsule was open and hemangioma involving the deep capsule and synovial layer was excised as well.  We cauterize numerous small bleeding vessels.  After copiously irrigating and obtaining hemostasis with the tourniquet deflated closed.  0 Vicryl was used in the capsule followed by 2-0 Vicryl in layers in the subcutaneous tissue and peritenon.  Monocryl was used in the skin followed by Steri-Strips and a sterile dry dressing.  The patient was extubated and taken to the recovery room in stable condition.  There were no complications.  I was present for all critical portions of the procedure.  The specimen was sent to pathology.  The estimated blood loss is 50 mL.    Earl Live MD

## 2020-12-17 NOTE — ANESTHESIA CARE TRANSFER NOTE
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    Anesthesia Type:   General     Note:  Airway :Room Air  Patient transferred to:PACU  Handoff Report: Identifed the Patient, Identified the Reponsible Provider, Reviewed the pertinent medical history, Discussed the surgical course, Reviewed Intra-OP anesthesia mangement and issues during anesthesia, Set expectations for post-procedure period and Allowed opportunity for questions and acknowledgement of understanding      Vitals: (Last set prior to Anesthesia Care Transfer)    CRNA VITALS  12/17/2020 1231 - 12/17/2020 1304      12/17/2020             Resp Rate (observed):  (!) 1    Resp Rate (set):  10                Electronically Signed By: AISHWARYA Garcia CRNA  December 17, 2020  1:04 PM

## 2020-12-18 LAB — COPATH REPORT: NORMAL

## 2020-12-24 ENCOUNTER — TELEPHONE (OUTPATIENT)
Dept: ORTHOPEDICS | Facility: CLINIC | Age: 25
End: 2020-12-24

## 2020-12-24 NOTE — TELEPHONE ENCOUNTER
L/M with path result - benign hemangioma.    Call with any concerns.  Will see Calos virtually in about a week.

## 2021-01-08 ENCOUNTER — VIRTUAL VISIT (OUTPATIENT)
Dept: ORTHOPEDICS | Facility: CLINIC | Age: 26
End: 2021-01-08
Payer: COMMERCIAL

## 2021-01-08 DIAGNOSIS — D18.01 HEMANGIOMA OF SUBCUTANEOUS TISSUE: Primary | ICD-10-CM

## 2021-01-08 PROCEDURE — 99024 POSTOP FOLLOW-UP VISIT: CPT | Performed by: ORTHOPAEDIC SURGERY

## 2021-01-08 NOTE — LETTER
1/8/2021         RE: Alfredito Moffett  [address]  [place] [address]        Dear Colleague,    Thank you for referring your patient, Alfredito Moffett, to the [facility]. Please see a copy of my visit note below.    Alfredito is a 25 year old who is being evaluated via a billable video visit.      How would you like to obtain your AVS? [employer]    Video Visit Technology for this patient: Deven Video Visit- Patient was left in waiting room    Will anyone else be joining your video visit? No    Video Start Time: 1:35 PM     Video-Visit Details    Type of service:  Video Visit    Video End Time:1:38 PM    Originating Location (pt. Location): Home    Distant Location (provider location):  [facility]     Platform used for Video Visit: Deven    This 25-year-old woman had a large hemangioma removed from the lateral aspect of the left knee.  She is feeling quite well.  The incision has healed up and her swelling has largely resolved.  She is even able to do some stationary biking.    She has no activity restrictions and may continue to increase her activity level as tolerated.  She will see me again as needed for pain or swelling in that area.  She does know that there is a chance that this will recur again.  I have answered all of her questions today.      Earl Live MD

## 2021-01-08 NOTE — PROGRESS NOTES
Alfredito is a 25 year old who is being evaluated via a billable video visit.      How would you like to obtain your AVS? [employer]      Video Visit Technology for this patient: Deven Video Visit- Patient was left in waiting room        Will anyone else be joining your video visit? No          Video Start Time: 1:35 PM     Video-Visit Details    Type of service:  Video Visit    Video End Time:1:38 PM    Originating Location (pt. Location): Home    Distant Location (provider location):  [facility]     Platform used for Video Visit: Deven    This 25-year-old woman had a large hemangioma removed from the lateral aspect of the left knee.  She is feeling quite well.  The incision has healed up and her swelling has largely resolved.  She is even able to do some stationary biking.    She has no activity restrictions and may continue to increase her activity level as tolerated.  She will see me again as needed for pain or swelling in that area.  She does know that there is a chance that this will recur again.  I have answered all of her questions today.

## 2021-01-08 NOTE — NURSING NOTE
Reason For Visit:   Chief Complaint   Patient presents with     Surgical Followup     post-op left knee mass excision DOS 12/17/20 // incision looks clean and dry without drainage // the knee still has a lttle swelling        LMP 12/10/2020     Pain Assessment  Patient Currently in Pain: No(no pain per pt)    Dalila Benson, ATC

## 2021-02-23 ENCOUNTER — MYC MEDICAL ADVICE (OUTPATIENT)
Dept: FAMILY MEDICINE | Facility: CLINIC | Age: 26
End: 2021-02-23

## 2021-02-23 DIAGNOSIS — Z11.1 SCREENING EXAMINATION FOR PULMONARY TUBERCULOSIS: Primary | ICD-10-CM

## 2021-02-23 NOTE — TELEPHONE ENCOUNTER
PN,  Please see below MyChart message and advise.  Pended TB quant order  Thanks,  Jaylyn GARCIA RN

## 2021-02-26 DIAGNOSIS — Z11.1 SCREENING EXAMINATION FOR PULMONARY TUBERCULOSIS: ICD-10-CM

## 2021-02-26 PROCEDURE — 36415 COLL VENOUS BLD VENIPUNCTURE: CPT | Performed by: FAMILY MEDICINE

## 2021-02-26 PROCEDURE — 86481 TB AG RESPONSE T-CELL SUSP: CPT | Performed by: FAMILY MEDICINE

## 2021-03-01 LAB
GAMMA INTERFERON BACKGROUND BLD IA-ACNC: 0.03 IU/ML
M TB IFN-G CD4+ BCKGRND COR BLD-ACNC: 9.97 IU/ML
M TB TUBERC IFN-G BLD QL: NEGATIVE
MITOGEN IGNF BCKGRD COR BLD-ACNC: 0 IU/ML
MITOGEN IGNF BCKGRD COR BLD-ACNC: 0.02 IU/ML

## 2021-03-02 NOTE — RESULT ENCOUNTER NOTE
Dear Alfredito,   Your test results are all back -   -All of your labs are normal.  Let us know if you have any questions.  -Roseanne Manriquez, DO

## 2021-04-11 ENCOUNTER — HEALTH MAINTENANCE LETTER (OUTPATIENT)
Age: 26
End: 2021-04-11

## 2021-05-12 NOTE — PROGRESS NOTES
SUBJECTIVE:   CC: Alfredito Moffett is an 25 year old woman who presents for preventive health visit.       Patient has been advised of split billing requirements and indicates understanding: Yes  Healthy Habits:     Getting at least 3 servings of Calcium per day:  Yes    Bi-annual eye exam:  Yes    Dental care twice a year:  Yes    Sleep apnea or symptoms of sleep apnea:  None    Diet:  Gluten-free/reduced and Other    Frequency of exercise:  6-7 days/week    Duration of exercise:  30-45 minutes    Taking medications regularly:  Yes    Barriers to taking medications:  None    Medication side effects:  None    PHQ-2 Total Score: 0    Additional concerns today:  No              Today's PHQ-2 Score:   PHQ-2 ( 1999 [employer]) 5/13/2021   Q1: Little interest or pleasure in doing things 0   Q2: Feeling down, depressed or hopeless 0   PHQ-2 Score 0   Q1: Little interest or pleasure in doing things -   Q2: Feeling down, depressed or hopeless -   PHQ-2 Score -       Abuse: Current or Past (Physical, Sexual or Emotional) - No  Do you feel safe in your environment? Yes    Have you ever done Advance Care Planning? (For example, a Health Directive, POLST, or a discussion with a medical provider or your loved ones about your wishes): No, advance care planning information given to patient to review.  Advanced care planning was discussed at today's visit.    Social History     Tobacco Use     Smoking status: Never Smoker     Smokeless tobacco: Never Used   Substance Use Topics     Alcohol use: Yes     Comment: A glass of wine or two per week     If you drink alcohol do you typically have >3 drinks per day or >7 drinks per week? No     No flowsheet data found.    Reviewed orders with patient.  Reviewed health maintenance and updated orders accordingly - Yes  Patient Active Problem List   Diagnosis     Hemangioma of subcutaneous tissue     LIZETH (generalized anxiety disorder)     Past Surgical History:   Procedure Laterality Date      "DENTAL SURGERY       EXCISE MASS LOWER EXTREMITY  8/1/2011    Procedure:EXCISE MASS LOWER EXTREMITY; Surgeon:CLARISSA DENT; Location:[facility] OR     RESECT TUMOR LOWER EXTREMITY Left 12/17/2020    Procedure: Excision left knee mass, knee arthrotomy;  Surgeon: Clarissa Dent MD;  Location: [facility] OR       Social History     Tobacco Use     Smoking status: Never Smoker     Smokeless tobacco: Never Used   Substance Use Topics     Alcohol use: Yes     Comment: A glass of wine or two per week     Family History   Problem Relation Age of Onset     Anxiety Disorder Maternal Grandmother            Breast Cancer Screening:        History of abnormal Pap smear: NO - age 21-29 PAP every 3 years recommended  PAP / HPV 8/7/2019   PAP NIL     Reviewed and updated as needed this visit by clinical staff  Tobacco  Allergies  Meds  Problems  Med Hx  Surg Hx  Fam Hx  Soc Hx          Reviewed and updated as needed this visit by Provider     Problems                Review of Systems  CONSTITUTIONAL: NEGATIVE for fever, chills, change in weight  INTEGUMENTARU/SKIN: NEGATIVE for worrisome rashes, moles or lesions  EYES: NEGATIVE for vision changes or irritation  ENT: NEGATIVE for ear, mouth and throat problems  RESP: NEGATIVE for significant cough or SOB  BREAST: NEGATIVE for masses, tenderness or discharge  CV: NEGATIVE for chest pain, palpitations or peripheral edema  GI: NEGATIVE for nausea, abdominal pain, heartburn, or change in bowel habits  : NEGATIVE for unusual urinary or vaginal symptoms. Periods are regular.  MUSCULOSKELETAL: NEGATIVE for significant arthralgias or myalgia  NEURO: NEGATIVE for weakness, dizziness or paresthesias  PSYCHIATRIC: NEGATIVE for changes in mood or affect     OBJECTIVE:   /68   Pulse 78   Temp 97.8  F (36.6  C) (Skin)   Resp 16   Ht 1.755 m (5' 9.09\")   Wt 66.2 kg (146 lb)   SpO2 97%   BMI 21.50 kg/m    Physical Exam  GENERAL: healthy, alert and no distress  EYES: " "Eyes grossly normal to inspection, PERRL and conjunctivae and sclerae normal  HENT: normal cephalic/atraumatic and both ears: occluded with wax and removed with lighted curette by myself - TM clear after   NECK: no adenopathy, no asymmetry, masses, or scars and thyroid normal to palpation  RESP: lungs clear to auscultation - no rales, rhonchi or wheezes  BREAST: normal without masses, tenderness or nipple discharge and no palpable axillary masses or adenopathy  CV: regular rate and rhythm, normal S1 S2, no S3 or S4, no murmur, click or rub, no peripheral edema and peripheral pulses strong  ABDOMEN: soft, nontender, no hepatosplenomegaly, no masses and bowel sounds normal  MS: no gross musculoskeletal defects noted, no edema  SKIN: no suspicious lesions or rashes  NEURO: Normal strength and tone, mentation intact and speech normal  PSYCH: mentation appears normal, affect normal/bright    Diagnostic Test Results:  Labs reviewed in Epic    ASSESSMENT/PLAN:   1. Routine general medical examination at a health care facility   Pap is UTD    2. LIZETH (generalized anxiety disorder)  Working with Dr. Sandoval    3.  Cerumen   Wax removed today - large amount without any complications or difficulties  See above       Patient has been advised of split billing requirements and indicates understanding: Yes  COUNSELING:  Reviewed preventive health counseling, as reflected in patient instructions    Estimated body mass index is 21.5 kg/m  as calculated from the following:    Height as of this encounter: 1.755 m (5' 9.09\").    Weight as of this encounter: 66.2 kg (146 lb).        She reports that she has never smoked. She has never used smokeless tobacco.      Counseling Resources:  ATP IV Guidelines  Pooled Cohorts Equation Calculator  Breast Cancer Risk Calculator  BRCA-Related Cancer Risk Assessment: FHS-7 Tool  FRAX Risk Assessment  ICSI Preventive Guidelines  Dietary Guidelines for Americans, 2010  USDA's MyPlate  ASA " Prophylaxis  Lung CA Screening    Roseanne Manriquez, [facility]

## 2021-05-14 ENCOUNTER — OFFICE VISIT (OUTPATIENT)
Dept: FAMILY MEDICINE | Facility: CLINIC | Age: 26
End: 2021-05-14
Payer: COMMERCIAL

## 2021-05-14 VITALS
RESPIRATION RATE: 16 BRPM | TEMPERATURE: 97.8 F | WEIGHT: 146 LBS | HEART RATE: 78 BPM | DIASTOLIC BLOOD PRESSURE: 68 MMHG | SYSTOLIC BLOOD PRESSURE: 118 MMHG | HEIGHT: 69 IN | OXYGEN SATURATION: 97 % | BODY MASS INDEX: 21.62 KG/M2

## 2021-05-14 DIAGNOSIS — H61.23 BILATERAL IMPACTED CERUMEN: ICD-10-CM

## 2021-05-14 DIAGNOSIS — F41.1 GAD (GENERALIZED ANXIETY DISORDER): ICD-10-CM

## 2021-05-14 DIAGNOSIS — Z00.00 ROUTINE GENERAL MEDICAL EXAMINATION AT A HEALTH CARE FACILITY: Primary | ICD-10-CM

## 2021-05-14 PROCEDURE — 99395 PREV VISIT EST AGE 18-39: CPT | Mod: 25 | Performed by: FAMILY MEDICINE

## 2021-05-14 PROCEDURE — 90651 9VHPV VACCINE 2/3 DOSE IM: CPT | Performed by: FAMILY MEDICINE

## 2021-05-14 PROCEDURE — 69210 REMOVE IMPACTED EAR WAX UNI: CPT | Mod: 50 | Performed by: FAMILY MEDICINE

## 2021-05-14 PROCEDURE — 90471 IMMUNIZATION ADMIN: CPT | Performed by: FAMILY MEDICINE

## 2021-05-14 RX ORDER — ESCITALOPRAM OXALATE 10 MG/1
15 TABLET ORAL DAILY
COMMUNITY
Start: 2021-05-14

## 2021-05-14 ASSESSMENT — MIFFLIN-ST. JEOR: SCORE: 1478.12

## 2021-06-02 ENCOUNTER — TELEPHONE (OUTPATIENT)
Dept: FAMILY MEDICINE | Facility: CLINIC | Age: 26
End: 2021-06-02

## 2021-06-02 NOTE — TELEPHONE ENCOUNTER
Patient calling to determine when next HPV vaccine should be   First one receive 5/14/2021  Does need the 3 vaccine series     Reviewed CDC recommendation with pt:        Also informed pt we are short staffed  Would be a 5pm, 515pm, or 530pm nurse only Mon through Thurs when she does callback to schedule  Acknowledged this is ok     Jaylyn GARCIA RN

## 2021-07-08 ENCOUNTER — ALLIED HEALTH/NURSE VISIT (OUTPATIENT)
Dept: NURSING | Facility: CLINIC | Age: 26
End: 2021-07-08
Payer: COMMERCIAL

## 2021-07-08 DIAGNOSIS — Z23 ENCOUNTER FOR IMMUNIZATION: Primary | ICD-10-CM

## 2021-07-08 PROCEDURE — 90471 IMMUNIZATION ADMIN: CPT

## 2021-07-08 PROCEDURE — 90651 9VHPV VACCINE 2/3 DOSE IM: CPT

## 2021-07-08 PROCEDURE — 99207 PR NO CHARGE NURSE ONLY: CPT

## 2021-07-08 NOTE — PROGRESS NOTES
Prior to immunization administration, verified patients identity using patient s name and date of birth. Please see Immunization Activity for additional information.     Screening Questionnaire for Adult Immunization    Are you sick today?   No   Do you have allergies to medications, food, a vaccine component or latex?   No   Have you ever had a serious reaction after receiving a vaccination?   No   Do you have a long-term health problem with heart, lung, kidney, or metabolic disease (e.g., diabetes), asthma, a blood disorder, no spleen, complement component deficiency, a cochlear implant, or a spinal fluid leak?  Are you on long-term aspirin therapy?   No   Do you have cancer, leukemia, HIV/AIDS, or any other immune system problem?   No   Do you have a parent, brother, or sister with an immune system problem?   No   In the past 3 months, have you taken medications that affect  your immune system, such as prednisone, other steroids, or anticancer drugs; drugs for the treatment of rheumatoid arthritis, Crohn s disease, or psoriasis; or have you had radiation treatments?   No   Have you had a seizure, or a brain or other nervous system problem?   No   During the past year, have you received a transfusion of blood or blood    products, or been given immune (gamma) globulin or antiviral drug?   No   For women: Are you pregnant or is there a chance you could become       pregnant during the next month?   No   Have you received any vaccinations in the past 4 weeks?   No     Immunization questionnaire answers were all negative.        Per orders of Dr. Brady, injection of HPV given by Fabby Ryan. Patient instructed to remain in clinic for 15 minutes afterwards, and to report any adverse reaction to me immediately.    Due to injection administration, patient instructed to remain in clinic for 15 minutes  afterwards, and to report any adverse reaction to me immediately.       Screening performed by Fabby Ryan on 7/8/2021 at  10:32 AM.

## 2021-09-25 ENCOUNTER — HEALTH MAINTENANCE LETTER (OUTPATIENT)
Age: 26
End: 2021-09-25

## 2021-11-30 ENCOUNTER — ALLIED HEALTH/NURSE VISIT (OUTPATIENT)
Dept: FAMILY MEDICINE | Facility: CLINIC | Age: 26
End: 2021-11-30
Payer: COMMERCIAL

## 2021-11-30 DIAGNOSIS — Z23 NEED FOR VACCINATION: Primary | ICD-10-CM

## 2021-11-30 PROCEDURE — 90471 IMMUNIZATION ADMIN: CPT

## 2021-11-30 PROCEDURE — 90651 9VHPV VACCINE 2/3 DOSE IM: CPT

## 2021-11-30 PROCEDURE — 99207 PR NO CHARGE NURSE ONLY: CPT

## 2021-11-30 NOTE — PROGRESS NOTES
Prior to immunization administration, verified patients identity using patient s name and date of birth. Please see Immunization Activity for additional information.     Screening Questionnaire for Adult Immunization    Are you sick today?   No   Do you have allergies to medications, food, a vaccine component or latex?   Yes   Have you ever had a serious reaction after receiving a vaccination?   No   Do you have a long-term health problem with heart, lung, kidney, or metabolic disease (e.g., diabetes), asthma, a blood disorder, no spleen, complement component deficiency, a cochlear implant, or a spinal fluid leak?  Are you on long-term aspirin therapy?   No   Do you have cancer, leukemia, HIV/AIDS, or any other immune system problem?   No   Do you have a parent, brother, or sister with an immune system problem?   No   In the past 3 months, have you taken medications that affect  your immune system, such as prednisone, other steroids, or anticancer drugs; drugs for the treatment of rheumatoid arthritis, Crohn s disease, or psoriasis; or have you had radiation treatments?   No   Have you had a seizure, or a brain or other nervous system problem?   No   During the past year, have you received a transfusion of blood or blood    products, or been given immune (gamma) globulin or antiviral drug?   No   For women: Are you pregnant or is there a chance you could become       pregnant during the next month?   No   Have you received any vaccinations in the past 4 weeks?   No     Immunization questionnaire was positive for at least one answer.  Dr. Manriquez aware.        Per orders of Dr. Manriquez, injection of HPV-9 given by Rip Uribe CMA. Patient instructed to remain in clinic for 15 minutes afterwards, and to report any adverse reaction to me immediately.       Screening performed by Rip Uribe CMA on 11/30/2021 at 11:22 AM.

## 2021-12-30 ENCOUNTER — MYC MEDICAL ADVICE (OUTPATIENT)
Dept: FAMILY MEDICINE | Facility: CLINIC | Age: 26
End: 2021-12-30
Payer: COMMERCIAL

## 2021-12-30 DIAGNOSIS — Z11.1 SCREENING EXAMINATION FOR PULMONARY TUBERCULOSIS: Primary | ICD-10-CM

## 2022-01-03 NOTE — TELEPHONE ENCOUNTER
Dr. Manriquez,    Please see Taptu message.   Lab cued.     Thanks,  KI Castillo  [facility]

## 2022-01-07 ENCOUNTER — LAB (OUTPATIENT)
Dept: LAB | Facility: CLINIC | Age: 27
End: 2022-01-07
Payer: COMMERCIAL

## 2022-01-07 DIAGNOSIS — Z11.1 SCREENING EXAMINATION FOR PULMONARY TUBERCULOSIS: ICD-10-CM

## 2022-01-07 PROCEDURE — 36415 COLL VENOUS BLD VENIPUNCTURE: CPT

## 2022-01-07 PROCEDURE — 86481 TB AG RESPONSE T-CELL SUSP: CPT

## 2022-01-09 LAB
QUANTIFERON MITOGEN: 10 IU/ML
QUANTIFERON NIL TUBE: 0.02 IU/ML
QUANTIFERON TB1 TUBE: 0.03 IU/ML
QUANTIFERON TB2 TUBE: 0.02

## 2022-01-10 LAB
GAMMA INTERFERON BACKGROUND BLD IA-ACNC: 0.02 IU/ML
M TB IFN-G BLD-IMP: NEGATIVE
M TB IFN-G CD4+ BCKGRND COR BLD-ACNC: 9.98 IU/ML
MITOGEN IGNF BCKGRD COR BLD-ACNC: 0 IU/ML
MITOGEN IGNF BCKGRD COR BLD-ACNC: 0.01 IU/ML

## 2022-07-02 ENCOUNTER — HEALTH MAINTENANCE LETTER (OUTPATIENT)
Age: 27
End: 2022-07-02

## 2022-09-22 ENCOUNTER — MYC MEDICAL ADVICE (OUTPATIENT)
Dept: FAMILY MEDICINE | Facility: CLINIC | Age: 27
End: 2022-09-22

## 2022-09-22 DIAGNOSIS — Z11.1 SCREENING EXAMINATION FOR PULMONARY TUBERCULOSIS: Primary | ICD-10-CM

## 2022-09-23 NOTE — TELEPHONE ENCOUNTER
PN,  Please see below MyChart message and advise.  Pended quantiferon if approved.  Will advise patient to schedule physical as well.  Thanks,  Miriam ALANIZ RN

## 2022-09-27 ENCOUNTER — LAB (OUTPATIENT)
Dept: LAB | Facility: CLINIC | Age: 27
End: 2022-09-27
Payer: COMMERCIAL

## 2022-09-27 DIAGNOSIS — Z11.1 SCREENING EXAMINATION FOR PULMONARY TUBERCULOSIS: ICD-10-CM

## 2022-09-27 PROCEDURE — 36415 COLL VENOUS BLD VENIPUNCTURE: CPT

## 2022-09-27 PROCEDURE — 86481 TB AG RESPONSE T-CELL SUSP: CPT

## 2022-09-29 LAB
GAMMA INTERFERON BACKGROUND BLD IA-ACNC: 0.04 IU/ML
M TB IFN-G BLD-IMP: NEGATIVE
M TB IFN-G CD4+ BCKGRND COR BLD-ACNC: 9.96 IU/ML
MITOGEN IGNF BCKGRD COR BLD-ACNC: 0.01 IU/ML
MITOGEN IGNF BCKGRD COR BLD-ACNC: 0.02 IU/ML
QUANTIFERON MITOGEN: 10 IU/ML
QUANTIFERON NIL TUBE: 0.04 IU/ML
QUANTIFERON TB1 TUBE: 0.06 IU/ML
QUANTIFERON TB2 TUBE: 0.05

## 2023-07-15 ENCOUNTER — HEALTH MAINTENANCE LETTER (OUTPATIENT)
Age: 28
End: 2023-07-15

## 2024-09-07 ENCOUNTER — HEALTH MAINTENANCE LETTER (OUTPATIENT)
Age: 29
End: 2024-09-07

## (undated) DEVICE — CAST PADDING 4" UNSTERILE 9044

## (undated) DEVICE — SOL NACL 0.9% IRRIG 1000ML BOTTLE 2F7124

## (undated) DEVICE — DRAPE STERI U 1015

## (undated) DEVICE — PREP DURAPREP REMOVER 4OZ 8611

## (undated) DEVICE — SU VICRYL 2-0 SH 27" UND J417H

## (undated) DEVICE — LINEN ORTHO PACK 5446

## (undated) DEVICE — SPONGE RAY-TEC 4X8" 7318

## (undated) DEVICE — SUCTION MANIFOLD NEPTUNE 2 SYS 1 PORT 702-025-000

## (undated) DEVICE — ESU PENCIL SMOKE EVAC W/ROCKER SWITCH 0703-047-000

## (undated) DEVICE — GOWN XLG DISP 9545

## (undated) DEVICE — SPECIMEN CONTAINER 5OZ STERILE 2600SA

## (undated) DEVICE — GLOVE PROTEXIS POWDER FREE SMT 7.0  2D72PT70X

## (undated) DEVICE — Device

## (undated) DEVICE — GLOVE PROTEXIS W/NEU-THERA 7.0  2D73TE70

## (undated) DEVICE — SU MONOCRYL 4-0 PS-2 18" UND Y496G

## (undated) DEVICE — DRAPE STOCKINETTE IMPERVIOUS 12" 1587

## (undated) DEVICE — PREP DURAPREP 26ML APL 8630

## (undated) DEVICE — PREP SKIN SCRUB TRAY 4461A

## (undated) DEVICE — TOURNIQUET SGL  BLADDER 30"X4" BLUE 5921030135

## (undated) DEVICE — PREP POVIDONE-IODINE 7.5% SCRUB 4OZ BOTTLE MDS093945

## (undated) DEVICE — GLOVE PROTEXIS BLUE W/NEU-THERA 7.5  2D73EB75

## (undated) DEVICE — DRAPE IOBAN INCISE 23X17" 6650EZ

## (undated) DEVICE — ESU GROUND PAD ADULT W/CORD E7507

## (undated) RX ORDER — ACETAMINOPHEN 325 MG/1
TABLET ORAL
Status: DISPENSED
Start: 2020-12-17

## (undated) RX ORDER — ONDANSETRON 2 MG/ML
INJECTION INTRAMUSCULAR; INTRAVENOUS
Status: DISPENSED
Start: 2020-12-17

## (undated) RX ORDER — PROPOFOL 10 MG/ML
INJECTION, EMULSION INTRAVENOUS
Status: DISPENSED
Start: 2020-12-17

## (undated) RX ORDER — CEFAZOLIN SODIUM 1 G/3ML
INJECTION, POWDER, FOR SOLUTION INTRAMUSCULAR; INTRAVENOUS
Status: DISPENSED
Start: 2020-12-17

## (undated) RX ORDER — GABAPENTIN 300 MG/1
CAPSULE ORAL
Status: DISPENSED
Start: 2020-12-17

## (undated) RX ORDER — DEXAMETHASONE SODIUM PHOSPHATE 4 MG/ML
INJECTION, SOLUTION INTRA-ARTICULAR; INTRALESIONAL; INTRAMUSCULAR; INTRAVENOUS; SOFT TISSUE
Status: DISPENSED
Start: 2020-12-17

## (undated) RX ORDER — LIDOCAINE HYDROCHLORIDE 20 MG/ML
INJECTION, SOLUTION EPIDURAL; INFILTRATION; INTRACAUDAL; PERINEURAL
Status: DISPENSED
Start: 2020-12-17

## (undated) RX ORDER — KETOROLAC TROMETHAMINE 30 MG/ML
INJECTION, SOLUTION INTRAMUSCULAR; INTRAVENOUS
Status: DISPENSED
Start: 2020-12-17

## (undated) RX ORDER — FENTANYL CITRATE 50 UG/ML
INJECTION, SOLUTION INTRAMUSCULAR; INTRAVENOUS
Status: DISPENSED
Start: 2020-12-17